# Patient Record
Sex: FEMALE | Race: WHITE | NOT HISPANIC OR LATINO | Employment: FULL TIME | RURAL
[De-identification: names, ages, dates, MRNs, and addresses within clinical notes are randomized per-mention and may not be internally consistent; named-entity substitution may affect disease eponyms.]

---

## 2022-04-27 ENCOUNTER — TELEMEDICINE (OUTPATIENT)
Dept: FAMILY MEDICINE CLINIC | Facility: CLINIC | Age: 52
End: 2022-04-27

## 2022-04-27 VITALS — BODY MASS INDEX: 27.66 KG/M2 | WEIGHT: 162 LBS | HEIGHT: 64 IN

## 2022-04-27 DIAGNOSIS — Z71.3 DIETARY COUNSELING AND SURVEILLANCE: Primary | ICD-10-CM

## 2022-04-27 PROBLEM — F32.A DEPRESSION: Status: ACTIVE | Noted: 2022-04-27

## 2022-04-27 PROCEDURE — 99213 OFFICE O/P EST LOW 20 MIN: CPT | Performed by: NURSE PRACTITIONER

## 2022-04-27 RX ORDER — VILAZODONE HYDROCHLORIDE 40 MG/1
40 TABLET ORAL DAILY
COMMUNITY

## 2022-04-27 RX ORDER — PHENTERMINE HYDROCHLORIDE 37.5 MG/1
37.5 TABLET ORAL
Qty: 30 TABLET | Refills: 0 | Status: SHIPPED | OUTPATIENT
Start: 2022-04-27 | End: 2022-06-14 | Stop reason: SDUPTHER

## 2022-04-27 NOTE — PROGRESS NOTES
"Chief Complaint  Med Refill and weight management  This was an audio and video enabled telemedicine encounter.  Subjective          Eryn Dao presents to [facility]  Patient is here today for follow-up on her weight management.  She has been taking phentermine and doing well.  She has been trying to watch her diet more closely and exercise.  She denies any side effects of the medication.      Objective   Vital Signs:   Ht 162.6 cm (64\")   Wt 73.5 kg (162 lb)   BMI 27.81 kg/m²     Body mass index is 27.81 kg/m².    Review of Systems   Constitutional: Negative for fatigue and fever.   Respiratory: Negative for shortness of breath.    Cardiovascular: Negative for chest pain, palpitations and leg swelling.   Neurological: Negative for syncope.   Psychiatric/Behavioral: The patient is not nervous/anxious.         Negative depression          Current Outpatient Medications:   •  vilazodone (VIIBRYD) 40 MG tablet tablet, Take 40 mg by mouth Daily., Disp: , Rfl:   •  phentermine (ADIPEX-P) 37.5 MG tablet, Take 1 tablet by mouth Every Morning Before Breakfast., Disp: 30 tablet, Rfl: 0      Allergies: Patient has no allergy information on record.    Physical Exam  Constitutional:       Appearance: Normal appearance.   Neurological:      Mental Status: She is alert.   Psychiatric:         Mood and Affect: Mood normal.         Thought Content: Thought content normal.         Judgment: Judgment normal.          Result Review :                   Assessment and Plan    Diagnoses and all orders for this visit:    1. Dietary counseling and surveillance (Primary)  Comments:  Continue phentermine.  1200 -calorie/day diet and exercise 3 to 5 days/week.  Patient is doing well with no side effects.  Orders:  -     phentermine (ADIPEX-P) 37.5 MG tablet; Take 1 tablet by mouth Every Morning Before Breakfast.  Dispense: 30 tablet; Refill: 0              BMI has not been calculated during today's encounter. "        Follow Up   No follow-ups on file.  Patient was given instructions and counseling regarding her condition or for health maintenance advice. Please see specific information pulled into the AVS if appropriate.     PAULA Luis

## 2022-06-14 ENCOUNTER — PATIENT MESSAGE (OUTPATIENT)
Dept: FAMILY MEDICINE CLINIC | Facility: CLINIC | Age: 52
End: 2022-06-14

## 2022-06-14 ENCOUNTER — TELEMEDICINE (OUTPATIENT)
Dept: FAMILY MEDICINE CLINIC | Facility: CLINIC | Age: 52
End: 2022-06-14

## 2022-06-14 VITALS — WEIGHT: 159 LBS | HEIGHT: 64 IN | BODY MASS INDEX: 27.14 KG/M2

## 2022-06-14 DIAGNOSIS — Z71.3 DIETARY COUNSELING AND SURVEILLANCE: Primary | ICD-10-CM

## 2022-06-14 DIAGNOSIS — Z71.3 DIETARY COUNSELING AND SURVEILLANCE: ICD-10-CM

## 2022-06-14 PROCEDURE — 99213 OFFICE O/P EST LOW 20 MIN: CPT | Performed by: NURSE PRACTITIONER

## 2022-06-14 RX ORDER — PHENTERMINE HYDROCHLORIDE 37.5 MG/1
37.5 TABLET ORAL
Qty: 30 TABLET | Refills: 0 | Status: SHIPPED | OUTPATIENT
Start: 2022-06-14 | End: 2022-07-27 | Stop reason: SDUPTHER

## 2022-06-14 NOTE — PROGRESS NOTES
"Chief Complaint  weight management  This was an audio and video enabled telemedicine encounter.  Subjective          Eryn Dao presents to [facility]  Pt is here to follow up on weight management. She has been taking Phentermine and doing well.       Objective   Vital Signs:   Ht 162.6 cm (64\")   Wt 72.1 kg (159 lb)   BMI 27.29 kg/m²     Body mass index is 27.29 kg/m².    Review of Systems   Constitutional: Negative for fatigue and fever.   Respiratory: Negative for shortness of breath.    Cardiovascular: Negative for chest pain, palpitations and leg swelling.   Neurological: Negative for syncope.   Psychiatric/Behavioral: The patient is not nervous/anxious.           Current Outpatient Medications:   •  phentermine (ADIPEX-P) 37.5 MG tablet, Take 1 tablet by mouth Every Morning Before Breakfast., Disp: 30 tablet, Rfl: 0  •  vilazodone (VIIBRYD) 40 MG tablet tablet, Take 40 mg by mouth Daily., Disp: , Rfl:       Allergies: Patient has no known allergies.    Physical Exam  Constitutional:       Appearance: Normal appearance.   Neurological:      Mental Status: She is alert.   Psychiatric:         Mood and Affect: Mood normal.         Thought Content: Thought content normal.         Judgment: Judgment normal.          Result Review :                   Assessment and Plan    Diagnoses and all orders for this visit:    1. Dietary counseling and surveillance (Primary)  Comments:  Continue Phentermine. 1200 baldev/day diet and exercise several days per week.   Orders:  -     phentermine (ADIPEX-P) 37.5 MG tablet; Take 1 tablet by mouth Every Morning Before Breakfast.  Dispense: 30 tablet; Refill: 0    2. Dietary counseling and surveillance  Comments:  Continue phentermine.  1200 -calorie/day diet and exercise 3 to 5 days/week.  Patient is doing well with no side effects.  Orders:  -     phentermine (ADIPEX-P) 37.5 MG tablet; Take 1 tablet by mouth Every Morning Before Breakfast.  Dispense: " 30 tablet; Refill: 0                Follow Up   No follow-ups on file.  Patient was given instructions and counseling regarding her condition or for health maintenance advice. Please see specific information pulled into the AVS if appropriate.     PAULA Luis

## 2022-07-27 ENCOUNTER — TELEMEDICINE (OUTPATIENT)
Dept: FAMILY MEDICINE CLINIC | Facility: CLINIC | Age: 52
End: 2022-07-27

## 2022-07-27 VITALS — BODY MASS INDEX: 27.49 KG/M2 | HEIGHT: 64 IN | WEIGHT: 161 LBS

## 2022-07-27 DIAGNOSIS — Z71.3 DIETARY COUNSELING AND SURVEILLANCE: ICD-10-CM

## 2022-07-27 PROCEDURE — 99213 OFFICE O/P EST LOW 20 MIN: CPT | Performed by: NURSE PRACTITIONER

## 2022-07-27 RX ORDER — PHENTERMINE HYDROCHLORIDE 37.5 MG/1
37.5 TABLET ORAL
Qty: 30 TABLET | Refills: 0 | Status: SHIPPED | OUTPATIENT
Start: 2022-07-27 | End: 2022-09-16 | Stop reason: SDUPTHER

## 2022-07-27 NOTE — PROGRESS NOTES
"Chief Complaint  weight management  This was an audio and video enabled telemedicine encounter.  Subjective          Eryn Dao presents to [facility]  Pt is here to follow up on her medications. She has been taking Phentermine for weight loss. She ran out a few days and gained a few pounds back. She denies side effects from the medication.       Objective   Vital Signs:   Ht 162.6 cm (64\")   Wt 73 kg (161 lb)   BMI 27.64 kg/m²     Body mass index is 27.64 kg/m².    Review of Systems   Constitutional: Negative for fatigue and fever.   Respiratory: Negative for shortness of breath.    Cardiovascular: Negative for chest pain, palpitations and leg swelling.   Neurological: Negative for syncope.   Psychiatric/Behavioral: The patient is not nervous/anxious.           Current Outpatient Medications:   •  phentermine (ADIPEX-P) 37.5 MG tablet, Take 1 tablet by mouth Every Morning Before Breakfast., Disp: 30 tablet, Rfl: 0  •  vilazodone (VIIBRYD) 40 MG tablet tablet, Take 40 mg by mouth Daily., Disp: , Rfl:       Allergies: Patient has no known allergies.    Physical Exam  Constitutional:       Appearance: Normal appearance.   Neurological:      Mental Status: She is alert.   Psychiatric:         Mood and Affect: Mood normal.         Behavior: Behavior normal.         Thought Content: Thought content normal.         Judgment: Judgment normal.          Result Review :                   Assessment and Plan    Diagnoses and all orders for this visit:    1. Dietary counseling and surveillance  Comments:  Continue phentermine.  1200 -calorie/day diet and exercise 3 to 5 days/week.  Patient is doing well with no side effects.  Orders:  -     phentermine (ADIPEX-P) 37.5 MG tablet; Take 1 tablet by mouth Every Morning Before Breakfast.  Dispense: 30 tablet; Refill: 0                Follow Up   No follow-ups on file.  Patient was given instructions and counseling regarding her condition or for " health maintenance advice. Please see specific information pulled into the AVS if appropriate.     PAULA Luis

## 2022-09-12 ENCOUNTER — PATIENT MESSAGE (OUTPATIENT)
Dept: FAMILY MEDICINE CLINIC | Facility: CLINIC | Age: 52
End: 2022-09-12

## 2022-09-12 ENCOUNTER — TELEMEDICINE (OUTPATIENT)
Dept: FAMILY MEDICINE CLINIC | Facility: CLINIC | Age: 52
End: 2022-09-12

## 2022-09-12 VITALS — HEIGHT: 64 IN | BODY MASS INDEX: 27.31 KG/M2 | WEIGHT: 160 LBS

## 2022-09-12 DIAGNOSIS — Z71.3 DIETARY COUNSELING AND SURVEILLANCE: ICD-10-CM

## 2022-09-12 DIAGNOSIS — Z71.3 DIETARY COUNSELING AND SURVEILLANCE: Primary | ICD-10-CM

## 2022-09-12 PROCEDURE — 99213 OFFICE O/P EST LOW 20 MIN: CPT | Performed by: NURSE PRACTITIONER

## 2022-09-12 NOTE — PROGRESS NOTES
"Chief Complaint  Med Refill  This was an audio and video enabled telemedicine encounter. Patient was in home, provider was in office.   Subjective          Eryn Dao presents to [facility]  Pt is here to follow up on weight management. She has been taking Phentermine and doing well. She is watching her diet and exercising.       Objective   Vital Signs:   Ht 162.6 cm (64\")   Wt 72.6 kg (160 lb)   BMI 27.46 kg/m²     Body mass index is 27.46 kg/m².    Review of Systems   Constitutional: Negative for fatigue and fever.   Respiratory: Negative for shortness of breath.    Cardiovascular: Negative for chest pain, palpitations and leg swelling.   Neurological: Negative for syncope.   Psychiatric/Behavioral: The patient is not nervous/anxious.           Current Outpatient Medications:   •  phentermine (ADIPEX-P) 37.5 MG tablet, Take 1 tablet by mouth Every Morning Before Breakfast., Disp: 30 tablet, Rfl: 0  •  vilazodone (VIIBRYD) 40 MG tablet tablet, Take 40 mg by mouth Daily., Disp: , Rfl:       Allergies: Patient has no known allergies.    Physical Exam  Constitutional:       Appearance: Normal appearance.   Neurological:      Mental Status: She is alert.   Psychiatric:         Mood and Affect: Mood normal.         Behavior: Behavior normal.         Thought Content: Thought content normal.         Judgment: Judgment normal.          Result Review :                   Assessment and Plan    Diagnoses and all orders for this visit:    1. Dietary counseling and surveillance (Primary)  Comments:  Continue Phentermine. 1200 baldev/day diet and exercise several days per week.   Orders:  -     phentermine (ADIPEX-P) 37.5 MG tablet; Take 1 tablet by mouth Every Morning Before Breakfast.  Dispense: 30 tablet; Refill: 0    2. Dietary counseling and surveillance  Comments:  Continue phentermine.  1200 -calorie/day diet and exercise 3 to 5 days/week.  Patient is doing well with no side " effects.  Orders:  -     phentermine (ADIPEX-P) 37.5 MG tablet; Take 1 tablet by mouth Every Morning Before Breakfast.  Dispense: 30 tablet; Refill: 0                Follow Up   Return in about 1 month (around 10/12/2022) for Recheck.  Patient was given instructions and counseling regarding her condition or for health maintenance advice. Please see specific information pulled into the AVS if appropriate.     PAULA Luis

## 2022-09-16 RX ORDER — PHENTERMINE HYDROCHLORIDE 37.5 MG/1
37.5 TABLET ORAL
Qty: 30 TABLET | Refills: 0 | Status: SHIPPED | OUTPATIENT
Start: 2022-09-16 | End: 2022-10-28 | Stop reason: SDUPTHER

## 2022-10-28 ENCOUNTER — TELEMEDICINE (OUTPATIENT)
Dept: FAMILY MEDICINE CLINIC | Facility: CLINIC | Age: 52
End: 2022-10-28

## 2022-10-28 DIAGNOSIS — F33.9 DEPRESSION, RECURRENT: ICD-10-CM

## 2022-10-28 DIAGNOSIS — Z71.3 DIETARY COUNSELING AND SURVEILLANCE: Primary | ICD-10-CM

## 2022-10-28 PROCEDURE — 99213 OFFICE O/P EST LOW 20 MIN: CPT | Performed by: NURSE PRACTITIONER

## 2022-10-28 RX ORDER — ESTRADIOL 0.04 MG/D
1 FILM, EXTENDED RELEASE TRANSDERMAL 2 TIMES WEEKLY
COMMUNITY
End: 2023-03-15

## 2022-10-28 RX ORDER — PROGESTERONE 100 MG/1
100 CAPSULE ORAL DAILY
COMMUNITY

## 2022-10-28 RX ORDER — PHENTERMINE HYDROCHLORIDE 37.5 MG/1
37.5 TABLET ORAL
Qty: 30 TABLET | Refills: 0 | Status: SHIPPED | OUTPATIENT
Start: 2022-10-28 | End: 2022-12-12 | Stop reason: SDUPTHER

## 2022-10-28 NOTE — PROGRESS NOTES
Chief Complaint  Med Refill  This was a video and audio enabled telemedicine encounter. The patient was in home. The provider was in office.   Subjective          Eryn Dao presents to [facility]  History of Present Illness  Pt is here for follow up on weight management. She has been taking Phentermine and doing well. She is active and tries to watch her diet.       Objective   Vital Signs:   There were no vitals taken for this visit.    There is no height or weight on file to calculate BMI.    Review of Systems   Constitutional: Negative for fatigue and fever.   Respiratory: Negative for shortness of breath.    Cardiovascular: Negative for chest pain, palpitations and leg swelling.   Neurological: Negative for syncope.   Psychiatric/Behavioral: The patient is not nervous/anxious.           Current Outpatient Medications:   •  estradiol (Lyllana) 0.0375 MG/24HR patch, Place 1 patch on the skin as directed by provider 2 (Two) Times a Week., Disp: , Rfl:   •  phentermine (ADIPEX-P) 37.5 MG tablet, Take 1 tablet by mouth Every Morning Before Breakfast., Disp: 30 tablet, Rfl: 0  •  Progesterone (PROMETRIUM) 100 MG capsule, Take 1 capsule by mouth Daily., Disp: , Rfl:   •  vilazodone (VIIBRYD) 40 MG tablet tablet, Take 40 mg by mouth Daily., Disp: , Rfl:       Allergies: Patient has no known allergies.    Physical Exam  Constitutional:       Appearance: Normal appearance.   Neurological:      Mental Status: She is alert.   Psychiatric:         Mood and Affect: Mood normal.         Behavior: Behavior normal.         Thought Content: Thought content normal.         Judgment: Judgment normal.          Result Review :                   Assessment and Plan    Diagnoses and all orders for this visit:    1. Dietary counseling and surveillance (Primary)  Comments:  Continue phentermine.  1200 -calorie/day diet and exercise 3 to 5 days/week.  Patient is doing well with no side effects.  Orders:  -      phentermine (ADIPEX-P) 37.5 MG tablet; Take 1 tablet by mouth Every Morning Before Breakfast.  Dispense: 30 tablet; Refill: 0    2. Depression, recurrent (HCC)  Comments:  Continue Viibryd. I will continue Phentermine for now as depression is worsened with weight gain.                 Follow Up   No follow-ups on file.  Patient was given instructions and counseling regarding her condition or for health maintenance advice. Please see specific information pulled into the AVS if appropriate.     PAULA Luis

## 2022-12-12 ENCOUNTER — TELEMEDICINE (OUTPATIENT)
Dept: FAMILY MEDICINE CLINIC | Facility: CLINIC | Age: 52
End: 2022-12-12

## 2022-12-12 VITALS — HEIGHT: 64 IN | BODY MASS INDEX: 26.98 KG/M2 | WEIGHT: 158 LBS

## 2022-12-12 DIAGNOSIS — E66.3 OVERWEIGHT (BMI 25.0-29.9): Primary | ICD-10-CM

## 2022-12-12 DIAGNOSIS — Z71.3 DIETARY COUNSELING AND SURVEILLANCE: ICD-10-CM

## 2022-12-12 PROCEDURE — 99213 OFFICE O/P EST LOW 20 MIN: CPT | Performed by: NURSE PRACTITIONER

## 2022-12-12 RX ORDER — PHENTERMINE HYDROCHLORIDE 37.5 MG/1
37.5 TABLET ORAL
Qty: 30 TABLET | Refills: 0 | Status: SHIPPED | OUTPATIENT
Start: 2022-12-12 | End: 2023-01-30 | Stop reason: SDUPTHER

## 2022-12-12 NOTE — PROGRESS NOTES
"Chief Complaint  weight management  This was a video and audio enabled telemedicine encounter. The patient was in home. The provider was in office.   Subjective          Eryn Dao presents to [facility]  History of Present Illness  Pt is here for follow up on weight management. She has been taking Phentermine and doing well. She denies side effects with the medication. She is watching her diet and exercising.       Objective   Vital Signs:   Ht 162.6 cm (64\")   Wt 71.7 kg (158 lb)   BMI 27.12 kg/m²     Body mass index is 27.12 kg/m².    Review of Systems   Constitutional: Negative for fatigue and fever.   Respiratory: Negative for shortness of breath.    Cardiovascular: Negative for chest pain, palpitations and leg swelling.   Neurological: Negative for syncope.   Psychiatric/Behavioral: The patient is not nervous/anxious.           Current Outpatient Medications:   •  phentermine (ADIPEX-P) 37.5 MG tablet, Take 1 tablet by mouth Every Morning Before Breakfast., Disp: 30 tablet, Rfl: 0  •  estradiol (Lyllana) 0.0375 MG/24HR patch, Place 1 patch on the skin as directed by provider 2 (Two) Times a Week., Disp: , Rfl:   •  Progesterone (PROMETRIUM) 100 MG capsule, Take 1 capsule by mouth Daily., Disp: , Rfl:   •  vilazodone (VIIBRYD) 40 MG tablet tablet, Take 40 mg by mouth Daily., Disp: , Rfl:       Allergies: Patient has no known allergies.    Physical Exam  Constitutional:       Appearance: Normal appearance.   Neurological:      Mental Status: She is alert.   Psychiatric:         Mood and Affect: Mood normal.         Behavior: Behavior normal.         Thought Content: Thought content normal.         Judgment: Judgment normal.          Result Review :                   Assessment and Plan    Diagnoses and all orders for this visit:    1. Overweight (BMI 25.0-29.9) (Primary)    2. Dietary counseling and surveillance  Comments:  Continue phentermine.  1200 -calorie/day diet and " exercise 3 to 5 days/week.  Patient is doing well with no side effects.  Orders:  -     phentermine (ADIPEX-P) 37.5 MG tablet; Take 1 tablet by mouth Every Morning Before Breakfast.  Dispense: 30 tablet; Refill: 0                Follow Up   No follow-ups on file.  Patient was given instructions and counseling regarding her condition or for health maintenance advice. Please see specific information pulled into the AVS if appropriate.     PAULA Luis

## 2023-01-30 ENCOUNTER — PATIENT MESSAGE (OUTPATIENT)
Dept: FAMILY MEDICINE CLINIC | Facility: CLINIC | Age: 53
End: 2023-01-30

## 2023-01-30 ENCOUNTER — TELEMEDICINE (OUTPATIENT)
Dept: FAMILY MEDICINE CLINIC | Facility: CLINIC | Age: 53
End: 2023-01-30
Payer: COMMERCIAL

## 2023-01-30 VITALS — WEIGHT: 158 LBS | BODY MASS INDEX: 26.98 KG/M2 | HEIGHT: 64 IN

## 2023-01-30 DIAGNOSIS — Z71.3 DIETARY COUNSELING AND SURVEILLANCE: ICD-10-CM

## 2023-01-30 PROCEDURE — 99213 OFFICE O/P EST LOW 20 MIN: CPT | Performed by: NURSE PRACTITIONER

## 2023-01-30 RX ORDER — PHENTERMINE HYDROCHLORIDE 37.5 MG/1
37.5 TABLET ORAL
Qty: 30 TABLET | Refills: 0 | Status: SHIPPED | OUTPATIENT
Start: 2023-01-30 | End: 2023-03-15 | Stop reason: SDUPTHER

## 2023-01-30 NOTE — PROGRESS NOTES
"Chief Complaint  weight management  This was a video and audio enabled telemedicine encounter. The patient was in home. The provider was in office.     Subjective          Eryn Dao presents to [facility]  History of Present Illness  Pt is here to follow up on weight management. She has been taking Phentermine and doing well. She is exercising and watching her diet.       Objective   Vital Signs:   Ht 162.6 cm (64\")   Wt 71.7 kg (158 lb)   BMI 27.12 kg/m²     Body mass index is 27.12 kg/m².    Review of Systems   Constitutional: Negative for fatigue and fever.   Respiratory: Negative for shortness of breath.    Cardiovascular: Negative for chest pain, palpitations and leg swelling.   Neurological: Negative for syncope.   Psychiatric/Behavioral: The patient is not nervous/anxious.           Current Outpatient Medications:   •  phentermine (ADIPEX-P) 37.5 MG tablet, Take 1 tablet by mouth Every Morning Before Breakfast., Disp: 30 tablet, Rfl: 0  •  estradiol (Lyllana) 0.0375 MG/24HR patch, Place 1 patch on the skin as directed by provider 2 (Two) Times a Week., Disp: , Rfl:   •  Progesterone (PROMETRIUM) 100 MG capsule, Take 1 capsule by mouth Daily., Disp: , Rfl:   •  vilazodone (VIIBRYD) 40 MG tablet tablet, Take 40 mg by mouth Daily., Disp: , Rfl:       Allergies: Patient has no known allergies.    Physical Exam  Constitutional:       Appearance: Normal appearance.   Neurological:      Mental Status: She is alert.   Psychiatric:         Mood and Affect: Mood normal.         Behavior: Behavior normal.         Thought Content: Thought content normal.         Judgment: Judgment normal.          Result Review :                   Assessment and Plan    Diagnoses and all orders for this visit:    1. Dietary counseling and surveillance  Comments:  Continue phentermine.  1200 -calorie/day diet and exercise 3 to 5 days/week.  Patient is doing well with no side effects.  Orders:  -     " phentermine (ADIPEX-P) 37.5 MG tablet; Take 1 tablet by mouth Every Morning Before Breakfast.  Dispense: 30 tablet; Refill: 0                Follow Up   No follow-ups on file.  Patient was given instructions and counseling regarding her condition or for health maintenance advice. Please see specific information pulled into the AVS if appropriate.     PAULA Luis

## 2023-03-15 ENCOUNTER — TELEMEDICINE (OUTPATIENT)
Dept: FAMILY MEDICINE CLINIC | Facility: CLINIC | Age: 53
End: 2023-03-15
Payer: COMMERCIAL

## 2023-03-15 VITALS — BODY MASS INDEX: 27.66 KG/M2 | WEIGHT: 162 LBS | HEIGHT: 64 IN

## 2023-03-15 DIAGNOSIS — E66.3 OVERWEIGHT: Primary | ICD-10-CM

## 2023-03-15 DIAGNOSIS — Z71.3 DIETARY COUNSELING AND SURVEILLANCE: ICD-10-CM

## 2023-03-15 PROCEDURE — 99213 OFFICE O/P EST LOW 20 MIN: CPT | Performed by: NURSE PRACTITIONER

## 2023-03-15 RX ORDER — PHENTERMINE HYDROCHLORIDE 37.5 MG/1
37.5 TABLET ORAL
Qty: 30 TABLET | Refills: 0 | Status: SHIPPED | OUTPATIENT
Start: 2023-03-15

## 2023-03-15 NOTE — PROGRESS NOTES
"Chief Complaint  weight management  This was a video and audio enabled telemedicine encounter. The patient was in home. The provider was in office.     Subjective          Eryn Dao presents to [facility]  History of Present Illness  Pt is here to follow up on weight management. She has been taking Phentermine and doing well.       Objective   Vital Signs:   Ht 162.6 cm (64\")   Wt 73.5 kg (162 lb)   BMI 27.81 kg/m²     Body mass index is 27.81 kg/m².    Review of Systems   Constitutional: Negative for fatigue and fever.   Respiratory: Negative for shortness of breath.    Cardiovascular: Negative for chest pain, palpitations and leg swelling.   Neurological: Negative for syncope.   Psychiatric/Behavioral: The patient is not nervous/anxious.           Current Outpatient Medications:   •  phentermine (ADIPEX-P) 37.5 MG tablet, Take 1 tablet by mouth Every Morning Before Breakfast., Disp: 30 tablet, Rfl: 0  •  Progesterone (PROMETRIUM) 100 MG capsule, Take 1 capsule by mouth Daily., Disp: , Rfl:   •  vilazodone (VIIBRYD) 40 MG tablet tablet, Take 40 mg by mouth Daily., Disp: , Rfl:       Allergies: Patient has no known allergies.    Physical Exam  Constitutional:       Appearance: Normal appearance.   Neurological:      Mental Status: She is alert.   Psychiatric:         Mood and Affect: Mood normal.         Behavior: Behavior normal.         Thought Content: Thought content normal.         Judgment: Judgment normal.          Result Review :                   Assessment and Plan    Diagnoses and all orders for this visit:    1. Overweight (Primary)    2. Dietary counseling and surveillance  Comments:  Continue phentermine.  1200 -calorie/day diet and exercise 3 to 5 days/week.  Patient is doing well with no side effects.  Orders:  -     phentermine (ADIPEX-P) 37.5 MG tablet; Take 1 tablet by mouth Every Morning Before Breakfast.  Dispense: 30 tablet; Refill: 0                Follow Up "   No follow-ups on file.  Patient was given instructions and counseling regarding her condition or for health maintenance advice. Please see specific information pulled into the AVS if appropriate.     Adrianne Garcia APRN

## 2023-04-19 RX ORDER — VILAZODONE HYDROCHLORIDE 40 MG/1
40 TABLET ORAL DAILY
Qty: 30 TABLET | Refills: 0 | Status: SHIPPED | OUTPATIENT
Start: 2023-04-19

## 2023-04-26 DIAGNOSIS — R41.840 ATTENTION DEFICIT: Primary | ICD-10-CM

## 2023-05-04 ENCOUNTER — OFFICE VISIT (OUTPATIENT)
Dept: FAMILY MEDICINE CLINIC | Facility: CLINIC | Age: 53
End: 2023-05-04
Payer: COMMERCIAL

## 2023-05-04 ENCOUNTER — TELEMEDICINE (OUTPATIENT)
Dept: PSYCHIATRY | Facility: CLINIC | Age: 53
End: 2023-05-04
Payer: COMMERCIAL

## 2023-05-04 VITALS
BODY MASS INDEX: 28.34 KG/M2 | SYSTOLIC BLOOD PRESSURE: 122 MMHG | DIASTOLIC BLOOD PRESSURE: 90 MMHG | HEIGHT: 64 IN | HEART RATE: 74 BPM | OXYGEN SATURATION: 99 % | WEIGHT: 166 LBS

## 2023-05-04 DIAGNOSIS — Z00.00 ROUTINE MEDICAL EXAM: Primary | ICD-10-CM

## 2023-05-04 DIAGNOSIS — Z13.220 SCREENING FOR LIPID DISORDERS: ICD-10-CM

## 2023-05-04 DIAGNOSIS — F33.9 DEPRESSION, RECURRENT: ICD-10-CM

## 2023-05-04 DIAGNOSIS — E56.9 VITAMIN DEFICIENCY: ICD-10-CM

## 2023-05-04 DIAGNOSIS — F98.8 ATTENTION DEFICIT DISORDER (ADD) WITHOUT HYPERACTIVITY: Primary | ICD-10-CM

## 2023-05-04 DIAGNOSIS — Z13.1 SCREENING FOR DIABETES MELLITUS: ICD-10-CM

## 2023-05-04 PROCEDURE — 99396 PREV VISIT EST AGE 40-64: CPT | Performed by: NURSE PRACTITIONER

## 2023-05-04 RX ORDER — VILAZODONE HYDROCHLORIDE 40 MG/1
40 TABLET ORAL DAILY
Qty: 90 TABLET | Refills: 3 | Status: SHIPPED | OUTPATIENT
Start: 2023-05-04

## 2023-05-04 RX ORDER — VILOXAZINE HYDROCHLORIDE 200 MG/1
CAPSULE, EXTENDED RELEASE ORAL
Qty: 69 CAPSULE | Refills: 0 | Status: SHIPPED | OUTPATIENT
Start: 2023-05-04 | End: 2023-06-03

## 2023-05-04 NOTE — PROGRESS NOTES
"Chief Complaint  Annual Exam    Subjective          Eryn Dao presents to [facility] for preventative yearly exam.   History of Present Illness  Pt is here for a physical exam and medication refills. She is doing well today with no complaints. She is active and tries to watch her diet. She feels that her depression is controlled.       Objective   Vital Signs:   /90   Pulse 74   Ht 162.6 cm (64\")   Wt 75.3 kg (166 lb)   SpO2 99%   BMI 28.49 kg/m²     Body mass index is 28.49 kg/m².    Predictive Model Details   No score data available for Risk of Fall        PHQ-9 Depression Screening  Little interest or pleasure in doing things?     Feeling down, depressed, or hopeless?     Trouble falling or staying asleep, or sleeping too much?     Feeling tired or having little energy?     Poor appetite or overeating?     Feeling bad about yourself - or that you are a failure or have let yourself or your family down?     Trouble concentrating on things, such as reading the newspaper or watching television?     Moving or speaking so slowly that other people could have noticed? Or the opposite - being so fidgety or restless that you have been moving around a lot more than usual?     Thoughts that you would be better off dead, or of hurting yourself in some way?     PHQ-9 Total Score     If you checked off any problems, how difficult have these problems made it for you to do your work, take care of things at home, or get along with other people?       Health Maintenance   Topic Date Due   • COLORECTAL CANCER SCREENING  Never done   • PAP SMEAR  06/10/2023 (Originally 4/27/2022)   • HEPATITIS C SCREENING  07/27/2023 (Originally 4/27/2022)   • COVID-19 Vaccine (3 - Booster for Moderna series) 07/27/2023 (Originally 3/16/2021)   • ZOSTER VACCINE (1 of 2) 07/27/2023 (Originally 5/4/2020)   • MAMMOGRAM  05/04/2024 (Originally 1970)   • INFLUENZA VACCINE  08/01/2023   • ANNUAL PHYSICAL  " 2024   • TDAP/TD VACCINES (2 - Td or Tdap) 2025   • Pneumococcal Vaccine 0-64  Aged Out        Immunization History   Administered Date(s) Administered   • COVID-19 (MODERNA) 1st,2nd,3rd Dose Monovalent 2020, 2021   • MMR 2015   • Tdap 2015       Review of Systems   Constitutional: Negative for fatigue and fever.   Respiratory: Negative for shortness of breath.    Cardiovascular: Negative for chest pain, palpitations and leg swelling.   Neurological: Negative for syncope.   Psychiatric/Behavioral: The patient is not nervous/anxious.         Past History:  Medical History: has a past medical history of Neurotic depression.   Surgical History: has a past surgical history that includes Hernia repair; Cosmetic surgery; and  section.   Family History: family history is not on file.   Social History: reports that she has never smoked. She has never used smokeless tobacco.       Allergies: Patient has no known allergies.    Physical Exam  Constitutional:       Appearance: Normal appearance.   HENT:      Head: Normocephalic.   Eyes:      Conjunctiva/sclera: Conjunctivae normal.      Pupils: Pupils are equal, round, and reactive to light.   Cardiovascular:      Rate and Rhythm: Normal rate and regular rhythm.      Heart sounds: Normal heart sounds.   Pulmonary:      Effort: Pulmonary effort is normal.      Breath sounds: Normal breath sounds.   Abdominal:      Tenderness: There is no abdominal tenderness.   Musculoskeletal:         General: Normal range of motion.   Skin:     General: Skin is warm and dry.      Capillary Refill: Capillary refill takes less than 2 seconds.   Neurological:      General: No focal deficit present.      Mental Status: She is alert and oriented to person, place, and time.   Psychiatric:         Mood and Affect: Mood normal.         Behavior: Behavior normal.         Thought Content: Thought content normal.         Judgment: Judgment normal.           Result Review :                   Assessment and Plan    Diagnoses and all orders for this visit:    1. Routine medical exam (Primary)  Comments:  We discussed diet, exercise, and prev counseling. Pap/mammo UTD. Pt will schedule colonoscopy. Labs drawn.   Orders:  -     CBC & Differential; Future  -     Comprehensive Metabolic Panel; Future  -     TSH; Future  -     CBC & Differential  -     Comprehensive Metabolic Panel  -     TSH    2. Screening for diabetes mellitus  -     Hemoglobin A1c; Future  -     Hemoglobin A1c    3. Screening for lipid disorders  -     Lipid Panel; Future  -     Lipid Panel    4. Vitamin deficiency  -     Vitamin B12; Future  -     Vitamin D,25-Hydroxy; Future  -     Folate; Future  -     Vitamin B12  -     Vitamin D,25-Hydroxy  -     Folate    5. Depression, recurrent  Comments:  Continue Viibryd.  Orders:  -     vilazodone (VIIBRYD) 40 MG tablet tablet; Take 1 tablet by mouth Daily.  Dispense: 90 tablet; Refill: 3                Current Outpatient Medications:   •  Progesterone (PROMETRIUM) 100 MG capsule, Take 1 capsule by mouth Daily., Disp: , Rfl:   •  vilazodone (VIIBRYD) 40 MG tablet tablet, Take 1 tablet by mouth Daily., Disp: 90 tablet, Rfl: 3    Follow Up   Return in about 1 year (around 5/4/2024) for Annual physical.  Patient was given instructions and counseling regarding her condition or for health maintenance advice. Please see specific information pulled into the AVS if appropriate.     PAULA Luis

## 2023-05-04 NOTE — PROGRESS NOTES
This provider is located at the [facility] (through [facility]), [address], [place], [address] using a secure [employer] Video Visit through [employer]. Patient is being seen remotely via telehealth at their home address [address] [facility], and stated they are in a secure environment for this session. The patient's condition being diagnosed/treated is appropriate for telemedicine. The provider identified herself as well as her credentials.   The patient, and/or patients guardian, consent to be seen remotely, and when consent is given they understand that the consent allows for patient identifiable information to be sent to a third party as needed.   They may refuse to be seen remotely at any time. The electronic data is encrypted and password protected, and the patient and/or guardian has been advised of the potential risks to privacy not withstanding such measures.    You have chosen to receive care through a telehealth visit.  Do you consent to use a video/audio connection for your medical care today? Yes    Patient identifiers utilized: Name and date of birth.    Patient verbally confirmed consent for today's encounter:  May 4, 2023    Subjective   Eryn Dao is a 53 y.o. female who presents today for initial evaluation     Chief Complaint:    Chief Complaint   Patient presents with   • Psychiatric Evaluation     Difficulty focusing/concentrating        History of Present Illness:  ADD  History of Present Illness  Eryn is a 53-year-old  female seen to establish outpatient behavioral health services.  Her initial evaluation today is for ADD, she is referred by her PCP.  Patient reports that she was prescribed phentermine for weight loss and noticed an improvement in symptoms consistent with ADD.  Of note phentermine has been used off label for ADD. Phentermine works by stimulating neurons to release the neurotransmitters dopamine and  "norepinephrine. This medication can improve focus and concentration. At this time, patient is not taking Phentermine and wishes to be screened for further evaluation for ADD as well as discussing nonstimulant options to treat ADD.      Medical history:  None    Surgical history:  Hernia repair   for abruption  Tummy tuck  Breast reduction    Illicit substance use:  Denies    ETOH:  One glass of wine per night    Psychosocial history:  Born: [place], [place]  Raised by: parents  Siblings: 1 brother, 2 years older than the patient  Describes childhood as: normal  Abuse history:  \" Physical: denies  \" Emotional: denies  \" Mental: denies  \" Sexual: denies  \" Rape: denies    Highest education level achieved: Masters degree  History of bullying? denies  Developmental delays/Special education classes: was shy and awkward, good student, \"B\" average  Work history: Physician assistant in ED    history: denies  Impulsive or risky behavior: denies  Legal history/previous chargers or incarcerations: denies  Marital status:   Number of children: 1 son  Self-harming behavior: denies  Suicidal attempts: denies  Social support: 3 close friends    Previous diagnoses: ? 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  Patient reports since the divorce she has had a substantial amount of responsibilities placed on her.  Patient reports she has difficulty keeping track of everything, trying to manage " everything on her own.  She has excellent support from friends who help her in her daily routine and she also works full-time.           The following portions of the patient's history were reviewed and updated as appropriate: allergies, current medications, past family history, past medical history, past social history, past surgical history and problem list.    Past Psychiatric History:  Began Treatment:never treated for ADD, but had relief of symptoms with Phentermine for weight loss  Diagnoses:PPD  Psychiatrist:[place]  Therapist:Denies  Admission History:Denies  Medication Trials:paxil, viibryd  Self Harm: Denies  Suicide Attempts:Denies   Psychosis, Anxiety, Depression: PPD    Past Medical History:  Past Medical History:   Diagnosis Date   • Neurotic depression        Substance Abuse History:   Types:Denies all, including illicit  Withdrawal Symptoms:Denies  Longest Period Sober:Not Applicable   AA: Not applicable     Social History:  Social History     Socioeconomic History   • Marital status:    Tobacco Use   • Smoking status: Never   • Smokeless tobacco: Never       Family History:  History reviewed. No pertinent family history.    Past Surgical History:  Past Surgical History:   Procedure Laterality Date   •  SECTION     • COSMETIC SURGERY     • HERNIA REPAIR         Problem List:  Patient Active Problem List   Diagnosis   • Depression       Allergy:   No Known Allergies     Current Medications:   Current Outpatient Medications   Medication Sig Dispense Refill   • Progesterone (PROMETRIUM) 100 MG capsule Take 1 capsule by mouth Daily.     • vilazodone (VIIBRYD) 40 MG tablet tablet Take 1 tablet by mouth Daily. 90 tablet 3   • Viloxazine HCl ER (Qelbree) 200 MG capsule sustained-release 24 hr Take 1 capsule by mouth Daily for 7 days, THEN 2 capsules Daily for 7 days, THEN 3 capsules Daily for 16 days. 69 capsule 0     No current facility-administered medications for this visit.        Review of Systems:    Review of Systems   Psychiatric/Behavioral: Positive for decreased concentration and stress.   All other systems reviewed and are negative.        Physical Exam:   Physical Exam  Constitutional:       General: She is awake.      Appearance: Normal appearance. She is well-developed.   Neurological:      Mental Status: She is alert and oriented to person, place, and time.   Psychiatric:         Attention and Perception: Attention and perception normal.         Mood and Affect: Mood and affect normal.         Speech: Speech normal.         Behavior: Behavior normal. Behavior is cooperative.         Thought Content: Thought content normal.         Cognition and Memory: Cognition and memory normal.         Judgment: Judgment normal.         Vitals:  There were no vitals taken for this visit. There is no height or weight on file to calculate BMI.  Due to extenuating circumstances and possible current health risks associated with the patient being present in a clinical setting (with current health restrictions in place in regards to possible COVID 19 transmission/exposure), the patient was seen remotely today via a MyChart Video Visit through [employer].  Unable to obtain vital signs due to nature of remote visit.  Height stated at 64 inches.  Weight stated at 166 pounds.    Last 3 Blood Pressure Readings:  BP Readings from Last 3 Encounters:   05/04/23 122/90       PHQ-9 Score:   PHQ-9 Total Score:      GARLAND-7 Score:   Feeling nervous, anxious or on edge: (P) Not at all  Not being able to stop or control worrying: (P) Several days  Worrying too much about different things: (P) Several days  Trouble Relaxing: (P) Several days  Being so restless that it is hard to sit still: (P) Not at all  Feeling afraid as if something awful might happen: (P) Not at all  Becoming easily annoyed or irritable: (P) Not at all  GARLAND 7 Total Score: (P) 3  If you checked any problems, how difficult have these problems made  it for you to do your work, take care of things at home, or get along with other people: (P) Not difficult at all     Mental Status Exam:   Hygiene:   good  Cooperation:  Cooperative  Eye Contact:  Good  Psychomotor Behavior:  Appropriate  Affect:  Appropriate  Mood: euthymic  Hopelessness: Denies  Speech:  Normal  Thought Process:  Goal directed and Linear  Thought Content:  Mood congruent  Suicidal:  None  Homicidal:  None  Hallucinations:  None  Delusion:  None  Memory:  Intact  Orientation:  Person, Place, Time and Situation  Reliability:  good  Insight:  Good  Judgement:  Good  Impulse Control:  Good  Physical/Medical Issues:  No        Lab Results:   No visits with results within 6 Month(s) from this visit.   Latest known visit with results is:   No results found for any previous visit.         Assessment & Plan   Assessment     ICD-10-CM ICD-9-CM   1. Attention deficit disorder (ADD) without hyperactivity  F98.8 314.00         Qelbree 200 mg daily for 7 days, then increase to 400 mg daily for 7 days, then increase to 600 mg daily for the next 2 weeks    Follow-up in 4 weeks or sooner if needed    SAFETY PLAN  Patient agrees to call 911/go to the nearest emergency department if he/she develops new or worsening SI, or develops intent or plan to act on these thoughts. Patient is agreeable to all elements of safety plan and verbalizes understanding.    GOALS:  Short Term Goals: Patient will be compliant with medication, and patient will have no significant medication related side effects.  Patient will be engaged in psychotherapy as indicated.  Patient will report subjective improvement of symptoms.  Long term goals: To stabilize mood and treat/improve subjective symptoms, the patient will stay out of the hospital, the patient will be at an optimal level of functioning, and the patient will take all medications as prescribed.  The patient/guardian verbalized understanding and agreement with goals that were  mutually set.      TREATMENT PLAN: Continue supportive psychotherapy efforts and medications as indicated.  Pharmacological and Non-Pharmacological treatment options discussed during today's visit. Patient/Guardian acknowledged and verbally consented with current treatment plan and was educated on the importance of compliance with treatment and follow-up appointments.      MEDICATION ISSUES:  Discussed medication options and treatment plan of prescribed medication as well as the risks, benefits, any black box warnings, and side effects including potential falls, possible impaired driving, and metabolic adversities among others. Patient is agreeable to call the office with any worsening of symptoms or onset of side effects, or if any concerns or questions arise.  The contact information for the office is made available to the patient. Patient is agreeable to call 911 or go to the nearest ER should they begin having any SI/HI, or if any urgent concerns arise. No medication side effects or related complaints today.     Medication Changes During Visit:   There are no discontinued medications.   New Medications Ordered This Visit   Medications   • Viloxazine HCl ER (Qelbree) 200 MG capsule sustained-release 24 hr     Sig: Take 1 capsule by mouth Daily for 7 days, THEN 2 capsules Daily for 7 days, THEN 3 capsules Daily for 16 days.     Dispense:  69 capsule     Refill:  0       Follow Up Appointment:   Return in about 1 month (around 6/4/2023) for Recheck.           This document has been electronically signed by PAULA Cochran  May 4, 2023 11:40 EDT    Dictated Utilizing Dragon Dictation: Part of this note may be an electronic transcription/translation of spoken language to printed text using the Dragon Dictation System.

## 2023-05-05 LAB
25(OH)D3+25(OH)D2 SERPL-MCNC: 25.5 NG/ML (ref 30–100)
ALBUMIN SERPL-MCNC: 4.3 G/DL (ref 3.8–4.9)
ALBUMIN/GLOB SERPL: 1.3 {RATIO} (ref 1.2–2.2)
ALP SERPL-CCNC: 90 IU/L (ref 44–121)
ALT SERPL-CCNC: 14 IU/L (ref 0–32)
AST SERPL-CCNC: 22 IU/L (ref 0–40)
BASOPHILS # BLD AUTO: 0.1 X10E3/UL (ref 0–0.2)
BASOPHILS NFR BLD AUTO: 1 %
BILIRUB SERPL-MCNC: 0.2 MG/DL (ref 0–1.2)
BUN SERPL-MCNC: 16 MG/DL (ref 6–24)
BUN/CREAT SERPL: 19 (ref 9–23)
CALCIUM SERPL-MCNC: 9.4 MG/DL (ref 8.7–10.2)
CHLORIDE SERPL-SCNC: 102 MMOL/L (ref 96–106)
CHOLEST SERPL-MCNC: 199 MG/DL (ref 100–199)
CO2 SERPL-SCNC: 24 MMOL/L (ref 20–29)
CREAT SERPL-MCNC: 0.84 MG/DL (ref 0.57–1)
EGFRCR SERPLBLD CKD-EPI 2021: 83 ML/MIN/1.73
EOSINOPHIL # BLD AUTO: 0.3 X10E3/UL (ref 0–0.4)
EOSINOPHIL NFR BLD AUTO: 5 %
ERYTHROCYTE [DISTWIDTH] IN BLOOD BY AUTOMATED COUNT: 11.8 % (ref 11.7–15.4)
FOLATE SERPL-MCNC: 2.6 NG/ML
GLOBULIN SER CALC-MCNC: 3.2 G/DL (ref 1.5–4.5)
GLUCOSE SERPL-MCNC: 100 MG/DL (ref 70–99)
HBA1C MFR BLD: 5.5 % (ref 4.8–5.6)
HCT VFR BLD AUTO: 43.9 % (ref 34–46.6)
HDLC SERPL-MCNC: 84 MG/DL
HGB BLD-MCNC: 15.2 G/DL (ref 11.1–15.9)
IMM GRANULOCYTES # BLD AUTO: 0 X10E3/UL (ref 0–0.1)
IMM GRANULOCYTES NFR BLD AUTO: 0 %
LDLC SERPL CALC-MCNC: 99 MG/DL (ref 0–99)
LYMPHOCYTES # BLD AUTO: 2.4 X10E3/UL (ref 0.7–3.1)
LYMPHOCYTES NFR BLD AUTO: 34 %
MCH RBC QN AUTO: 30.4 PG (ref 26.6–33)
MCHC RBC AUTO-ENTMCNC: 34.6 G/DL (ref 31.5–35.7)
MCV RBC AUTO: 88 FL (ref 79–97)
MONOCYTES # BLD AUTO: 0.7 X10E3/UL (ref 0.1–0.9)
MONOCYTES NFR BLD AUTO: 10 %
NEUTROPHILS # BLD AUTO: 3.5 X10E3/UL (ref 1.4–7)
NEUTROPHILS NFR BLD AUTO: 50 %
PLATELET # BLD AUTO: 212 X10E3/UL (ref 150–450)
POTASSIUM SERPL-SCNC: 5 MMOL/L (ref 3.5–5.2)
PROT SERPL-MCNC: 7.5 G/DL (ref 6–8.5)
RBC # BLD AUTO: 5 X10E6/UL (ref 3.77–5.28)
SODIUM SERPL-SCNC: 140 MMOL/L (ref 134–144)
TRIGL SERPL-MCNC: 90 MG/DL (ref 0–149)
TSH SERPL DL<=0.005 MIU/L-ACNC: 2.35 UIU/ML (ref 0.45–4.5)
VIT B12 SERPL-MCNC: 558 PG/ML (ref 232–1245)
VLDLC SERPL CALC-MCNC: 16 MG/DL (ref 5–40)
WBC # BLD AUTO: 6.9 X10E3/UL (ref 3.4–10.8)

## 2023-05-30 ENCOUNTER — TELEMEDICINE (OUTPATIENT)
Dept: PSYCHIATRY | Facility: CLINIC | Age: 53
End: 2023-05-30

## 2023-05-30 DIAGNOSIS — F98.8 ATTENTION DEFICIT DISORDER (ADD) WITHOUT HYPERACTIVITY: Primary | ICD-10-CM

## 2023-05-30 RX ORDER — VILOXAZINE HYDROCHLORIDE 200 MG/1
600 CAPSULE, EXTENDED RELEASE ORAL DAILY
Qty: 90 CAPSULE | Refills: 0 | Status: SHIPPED | OUTPATIENT
Start: 2023-05-30 | End: 2023-06-29

## 2023-05-30 NOTE — PROGRESS NOTES
This provider is located at the Behavioral [facility] (through [facility]), [address], [place], [address] using a secure Cardiac [employer] Video Visit through [employer]. Patient is being seen remotely via telehealth at their home address [address], and stated they are in a secure environment for this session. The patient's condition being diagnosed/treated is appropriate for telemedicine. The provider identified herself as well as her credentials.   The patient, and/or patients guardian, consent to be seen remotely, and when consent is given they understand that the consent allows for patient identifiable information to be sent to a third party as needed.   They may refuse to be seen remotely at any time. The electronic data is encrypted and password protected, and the patient and/or guardian has been advised of the potential risks to privacy not withstanding such measures.    You have chosen to receive care through a telehealth visit.  Do you consent to use a video/audio connection for your medical care today? Yes    Patient identifiers utilized: Name and date of birth.    Patient verbally confirmed consent for today's encounter:  May 30, 2023    Subjective   Eryn Dao is a 53 y.o. female who presents today for follow up    Chief Complaint:    Chief Complaint   Patient presents with   • Med Management     Adult ADD        History of Present Illness:  ADD  History of Present Illness  Eryn is a 53-year-old  female seen for follow up outpatient behavioral health services.  She is being treated by this provider for adult ADD, and is prescribed Qelbree, currently has only been on 600 mg dose x 1 week as we have been tapering up per guideline recommendations.    States she has seen a big difference since she has been on the 600 mg, states she is able to focus and concentrate more. She has seen overall improvement in wakefulness. Medication has not affected  her sleep or her appetite.            The following portions of the patient's history were reviewed and updated as appropriate: allergies, current medications, past family history, past medical history, past social history, past surgical history and problem list.    Past Psychiatric History:  Began Treatment:never treated for ADD, but had relief of symptoms with Phentermine for weight loss  Diagnoses:PPD  Psychiatrist:Damien  Therapist:Denies  Admission History:Denies  Medication Trials:paxil, viibryd, qelbree  Self Harm: Denies  Suicide Attempts:Denies   Psychosis, Anxiety, Depression: PPD    Past Medical History:  Past Medical History:   Diagnosis Date   • Neurotic depression        Substance Abuse History:   Types:Denies all, including illicit  Withdrawal Symptoms:Denies  Longest Period Sober:Not Applicable   AA: Not applicable     Social History:  Social History     Socioeconomic History   • Marital status:    Tobacco Use   • Smoking status: Never   • Smokeless tobacco: Never       Family History:  History reviewed. No pertinent family history.    Past Surgical History:  Past Surgical History:   Procedure Laterality Date   •  SECTION     • COSMETIC SURGERY     • HERNIA REPAIR         Problem List:  Patient Active Problem List   Diagnosis   • Depression       Allergy:   No Known Allergies     Current Medications:   Current Outpatient Medications   Medication Sig Dispense Refill   • Progesterone (PROMETRIUM) 100 MG capsule Take 1 capsule by mouth Daily.     • vilazodone (VIIBRYD) 40 MG tablet tablet Take 1 tablet by mouth Daily. 90 tablet 3   • Viloxazine HCl ER (Qelbree) 200 MG capsule sustained-release 24 hr Take 1 capsule by mouth Daily for 7 days, THEN 2 capsules Daily for 7 days, THEN 3 capsules Daily for 16 days. 69 capsule 0     No current facility-administered medications for this visit.       Review of Systems:    Review of Systems   All other systems reviewed and are  negative.        Physical Exam:   Physical Exam  Constitutional:       General: She is awake.      Appearance: Normal appearance. She is well-developed.   Neurological:      Mental Status: She is alert and oriented to person, place, and time.   Psychiatric:         Attention and Perception: Attention and perception normal.         Mood and Affect: Mood and affect normal.         Speech: Speech normal.         Behavior: Behavior normal. Behavior is cooperative.         Thought Content: Thought content normal.         Cognition and Memory: Cognition and memory normal.         Judgment: Judgment normal.         Vitals:  There were no vitals taken for this visit. There is no height or weight on file to calculate BMI.  Due to extenuating circumstances and possible current health risks associated with the patient being present in a clinical setting (with current health restrictions in place in regards to possible COVID 19 transmission/exposure), the patient was seen remotely today via a MyChart Video Visit through [employer].  Unable to obtain vital signs due to nature of remote visit.  Height stated at 64 inches.  Weight stated at 166 pounds.    Last 3 Blood Pressure Readings:  BP Readings from Last 3 Encounters:   05/04/23 122/90       PHQ-9 Score:   PHQ-9 Total Score:      GARLAND-7 Score:   Feeling nervous, anxious or on edge: (P) Not at all  Not being able to stop or control worrying: (P) Several days  Worrying too much about different things: (P) Several days  Trouble Relaxing: (P) Several days  Being so restless that it is hard to sit still: (P) Several days  Feeling afraid as if something awful might happen: (P) Not at all  Becoming easily annoyed or irritable: (P) Not at all  GARLAND 7 Total Score: (P) 4  If you checked any problems, how difficult have these problems made it for you to do your work, take care of things at home, or get along with other people: (P) Not difficult at all     Mental Status Exam:   Hygiene:    good  Cooperation:  Cooperative  Eye Contact:  Good  Psychomotor Behavior:  Appropriate  Affect:  Appropriate  Mood: euthymic  Hopelessness: Denies  Speech:  Normal  Thought Process:  Goal directed and Linear  Thought Content:  Mood congruent  Suicidal:  None  Homicidal:  None  Hallucinations:  None  Delusion:  None  Memory:  Intact  Orientation:  Person, Place, Time and Situation  Reliability:  good  Insight:  Good  Judgement:  Good  Impulse Control:  Good  Physical/Medical Issues:  No        Lab Results:   Office Visit on 05/04/2023   Component Date Value Ref Range Status   • Glucose 05/04/2023 100 (H)  70 - 99 mg/dL Final   • BUN 05/04/2023 16  6 - 24 mg/dL Final   • Creatinine 05/04/2023 0.84  0.57 - 1.00 mg/dL Final   • EGFR Result 05/04/2023 83  >59 mL/min/1.73 Final   • BUN/Creatinine Ratio 05/04/2023 19  9 - 23 Final   • Sodium 05/04/2023 140  134 - 144 mmol/L Final   • Potassium 05/04/2023 5.0  3.5 - 5.2 mmol/L Final   • Chloride 05/04/2023 102  96 - 106 mmol/L Final   • Total CO2 05/04/2023 24  20 - 29 mmol/L Final   • Calcium 05/04/2023 9.4  8.7 - 10.2 mg/dL Final   • Total Protein 05/04/2023 7.5  6.0 - 8.5 g/dL Final   • Albumin 05/04/2023 4.3  3.8 - 4.9 g/dL Final   • Globulin 05/04/2023 3.2  1.5 - 4.5 g/dL Final   • A/G Ratio 05/04/2023 1.3  1.2 - 2.2 Final   • Total Bilirubin 05/04/2023 0.2  0.0 - 1.2 mg/dL Final   • Alkaline Phosphatase 05/04/2023 90  44 - 121 IU/L Final   • AST (SGOT) 05/04/2023 22  0 - 40 IU/L Final   • ALT (SGPT) 05/04/2023 14  0 - 32 IU/L Final   • Hemoglobin A1C 05/04/2023 5.5  4.8 - 5.6 % Final             Prediabetes: 5.7 - 6.4           Diabetes: >6.4           Glycemic control for adults with diabetes: <7.0   • Total Cholesterol 05/04/2023 199  100 - 199 mg/dL Final   • Triglycerides 05/04/2023 90  0 - 149 mg/dL Final   • HDL Cholesterol 05/04/2023 84  >39 mg/dL Final   • VLDL Cholesterol Ralph 05/04/2023 16  5 - 40 mg/dL Final   • LDL Chol Calc ([facility]) 05/04/2023 99  0 - 99  mg/dL Final   • TSH 05/04/2023 2.350  0.450 - 4.500 uIU/mL Final   • Vitamin B-12 05/04/2023 558  232 - 1245 pg/mL Final   • 25 Hydroxy, Vitamin D 05/04/2023 25.5 (L)  30.0 - 100.0 ng/mL Final    Vitamin D deficiency has been defined by the [place] of  Medicine and an Endocrine Society practice guideline as a  level of serum 25-OH vitamin D less than 20 ng/mL (1,2).  The Endocrine Society went on to further define vitamin D  insufficiency as a level between 21 and 29 ng/mL (2).  1. IOM ([place] of Medicine). 2010. Dietary reference     intakes for calcium and D. [place]: The     National Academies Press.  2. Regis MF, Patrick HUSTON, Christal GREEN, et al.     Evaluation, treatment, and prevention of vitamin D     deficiency: an Endocrine Society clinical practice     guideline. JCEM. 2011 Jul; 96(7):1911-30.   • Folate 05/04/2023 2.6 (L)  >3.0 ng/mL Final    A serum folate concentration of less than 3.1 ng/mL is  considered to represent clinical deficiency.   • WBC 05/04/2023 6.9  3.4 - 10.8 x10E3/uL Final   • RBC 05/04/2023 5.00  3.77 - 5.28 x10E6/uL Final   • Hemoglobin 05/04/2023 15.2  11.1 - 15.9 g/dL Final   • Hematocrit 05/04/2023 43.9  34.0 - 46.6 % Final   • MCV 05/04/2023 88  79 - 97 fL Final   • MCH 05/04/2023 30.4  26.6 - 33.0 pg Final   • MCHC 05/04/2023 34.6  31.5 - 35.7 g/dL Final   • RDW 05/04/2023 11.8  11.7 - 15.4 % Final   • Platelets 05/04/2023 212  150 - 450 x10E3/uL Final   • Neutrophil Rel % 05/04/2023 50  Not Estab. % Final   • Lymphocyte Rel % 05/04/2023 34  Not Estab. % Final   • Monocyte Rel % 05/04/2023 10  Not Estab. % Final   • Eosinophil Rel % 05/04/2023 5  Not Estab. % Final   • Basophil Rel % 05/04/2023 1  Not Estab. % Final   • Neutrophils Absolute 05/04/2023 3.5  1.4 - 7.0 x10E3/uL Final   • Lymphocytes Absolute 05/04/2023 2.4  0.7 - 3.1 x10E3/uL Final   • Monocytes Absolute 05/04/2023 0.7  0.1 - 0.9 x10E3/uL Final   • Eosinophils Absolute 05/04/2023 0.3  0.0 - 0.4  x10E3/uL Final   • Basophils Absolute 05/04/2023 0.1  0.0 - 0.2 x10E3/uL Final   • Immature Granulocyte Rel % 05/04/2023 0  Not Estab. % Final   • Immature Grans Absolute 05/04/2023 0.0  0.0 - 0.1 x10E3/uL Final         Assessment & Plan   Assessment     ICD-10-CM ICD-9-CM   1. Attention deficit disorder (ADD) without hyperactivity  F98.8 314.00         Qelbree 600 mg daily     SAFETY PLAN  Patient agrees to call 911/go to the nearest emergency department if he/she develops new or worsening SI, or develops intent or plan to act on these thoughts. Patient is agreeable to all elements of safety plan and verbalizes understanding.    GOALS:  Short Term Goals: Patient will be compliant with medication, and patient will have no significant medication related side effects.  Patient will be engaged in psychotherapy as indicated.  Patient will report subjective improvement of symptoms.  Long term goals: To stabilize mood and treat/improve subjective symptoms, the patient will stay out of the hospital, the patient will be at an optimal level of functioning, and the patient will take all medications as prescribed.  The patient/guardian verbalized understanding and agreement with goals that were mutually set.      TREATMENT PLAN: Continue supportive psychotherapy efforts and medications as indicated.  Pharmacological and Non-Pharmacological treatment options discussed during today's visit. Patient/Guardian acknowledged and verbally consented with current treatment plan and was educated on the importance of compliance with treatment and follow-up appointments.      MEDICATION ISSUES:  Discussed medication options and treatment plan of prescribed medication as well as the risks, benefits, any black box warnings, and side effects including potential falls, possible impaired driving, and metabolic adversities among others. Patient is agreeable to call the office with any worsening of symptoms or onset of side effects, or if any  concerns or questions arise.  The contact information for the office is made available to the patient. Patient is agreeable to call 911 or go to the nearest ER should they begin having any SI/HI, or if any urgent concerns arise. No medication side effects or related complaints today.     Medication Changes During Visit:   There are no discontinued medications.   No orders of the defined types were placed in this encounter.      Follow Up Appointment:   Return in about 4 weeks (around 6/27/2023) for Recheck.           This document has been electronically signed by PAULA Cochran  May 30, 2023 10:15 EDT    Dictated Utilizing Dragon Dictation: Part of this note may be an electronic transcription/translation of spoken language to printed text using the Dragon Dictation System.

## 2024-01-11 ENCOUNTER — OFFICE VISIT (OUTPATIENT)
Dept: FAMILY MEDICINE CLINIC | Facility: CLINIC | Age: 54
End: 2024-01-11
Payer: COMMERCIAL

## 2024-01-11 VITALS
BODY MASS INDEX: 30.12 KG/M2 | DIASTOLIC BLOOD PRESSURE: 70 MMHG | SYSTOLIC BLOOD PRESSURE: 102 MMHG | HEIGHT: 63 IN | HEART RATE: 67 BPM | OXYGEN SATURATION: 97 % | WEIGHT: 170 LBS

## 2024-01-11 DIAGNOSIS — F33.9 DEPRESSION, RECURRENT: ICD-10-CM

## 2024-01-11 DIAGNOSIS — M54.2 NECK PAIN: Primary | ICD-10-CM

## 2024-01-11 DIAGNOSIS — F90.9 ATTENTION DEFICIT HYPERACTIVITY DISORDER (ADHD), UNSPECIFIED ADHD TYPE: ICD-10-CM

## 2024-01-11 PROCEDURE — 99214 OFFICE O/P EST MOD 30 MIN: CPT | Performed by: NURSE PRACTITIONER

## 2024-01-11 RX ORDER — METHOCARBAMOL 750 MG/1
1500 TABLET, FILM COATED ORAL 3 TIMES DAILY
Qty: 90 TABLET | Refills: 2 | Status: SHIPPED | OUTPATIENT
Start: 2024-01-11

## 2024-01-11 RX ORDER — ATOMOXETINE 80 MG/1
80 CAPSULE ORAL DAILY
Qty: 90 CAPSULE | Refills: 3 | Status: SHIPPED | OUTPATIENT
Start: 2024-01-11

## 2024-01-11 RX ORDER — ATOMOXETINE 80 MG/1
CAPSULE ORAL
COMMUNITY
Start: 2023-12-20 | End: 2024-01-11 | Stop reason: SDUPTHER

## 2024-01-11 RX ORDER — VILAZODONE HYDROCHLORIDE 40 MG/1
40 TABLET ORAL DAILY
Qty: 90 TABLET | Refills: 3 | Status: SHIPPED | OUTPATIENT
Start: 2024-01-11

## 2024-01-11 NOTE — PROGRESS NOTES
"Chief Complaint  Fatigue and Neck Pain    Subjective          Eryn Dao presents to [facility]  History of Present Illness  Pt has had neck pain that causes headaches. She has been taking Strattera but does not feel that this is controlling her symptoms. Her Viibryd is working well. She has had chronic fatigue.   Fatigue  Associated symptoms include fatigue and neck pain. Pertinent negatives include no chest pain or fever.   Neck Pain   Pertinent negatives include no chest pain or fever.       Objective   Vital Signs:   /70   Pulse 67   Ht 160 cm (63\")   Wt 77.1 kg (170 lb)   SpO2 97%   BMI 30.11 kg/m²     Body mass index is 30.11 kg/m².    Review of Systems   Constitutional:  Positive for fatigue. Negative for fever.   Respiratory:  Negative for shortness of breath.    Cardiovascular:  Negative for chest pain, palpitations and leg swelling.   Musculoskeletal:  Positive for neck pain.   Neurological:  Negative for syncope.   Psychiatric/Behavioral:  The patient is not nervous/anxious.           Current Outpatient Medications:     atomoxetine (STRATTERA) 80 MG capsule, Take 1 capsule by mouth Daily., Disp: 90 capsule, Rfl: 3    methocarbamol (ROBAXIN) 750 MG tablet, Take 2 tablets by mouth 3 (Three) Times a Day., Disp: 90 tablet, Rfl: 2    vilazodone (VIIBRYD) 40 MG tablet tablet, Take 1 tablet by mouth Daily., Disp: 90 tablet, Rfl: 3      Allergies: Contrast dye (echo or unknown ct/mr)    Physical Exam  Constitutional:       Appearance: Normal appearance.   HENT:      Head: Normocephalic.   Eyes:      Conjunctiva/sclera: Conjunctivae normal.      Pupils: Pupils are equal, round, and reactive to light.   Cardiovascular:      Rate and Rhythm: Normal rate and regular rhythm.      Heart sounds: Normal heart sounds.   Pulmonary:      Effort: Pulmonary effort is normal.      Breath sounds: Normal breath sounds.   Abdominal:      Tenderness: There is no abdominal tenderness. "   Musculoskeletal:         General: Normal range of motion.   Skin:     General: Skin is warm and dry.      Capillary Refill: Capillary refill takes less than 2 seconds.   Neurological:      General: No focal deficit present.      Mental Status: She is alert and oriented to person, place, and time.   Psychiatric:         Mood and Affect: Mood normal.         Behavior: Behavior normal.         Thought Content: Thought content normal.         Judgment: Judgment normal.          Result Review :                   Assessment and Plan    Diagnoses and all orders for this visit:    1. Neck pain (Primary)  Comments:  XRs done and MRI ordered. We may refer to neurosurgery.  Orders:  -     methocarbamol (ROBAXIN) 750 MG tablet; Take 2 tablets by mouth 3 (Three) Times a Day.  Dispense: 90 tablet; Refill: 2  -     XR Spine Cervical 2 or 3 View; Future  -     MRI Cervical Spine Without Contrast; Future    2. Depression, recurrent  Comments:  Continue Viibryd.  Orders:  -     vilazodone (VIIBRYD) 40 MG tablet tablet; Take 1 tablet by mouth Daily.  Dispense: 90 tablet; Refill: 3    3. Attention deficit hyperactivity disorder (ADHD), unspecified ADHD type  Comments:  Continue Strattera. F/U with mental health to discuss alternatives.  Orders:  -     atomoxetine (STRATTERA) 80 MG capsule; Take 1 capsule by mouth Daily.  Dispense: 90 capsule; Refill: 3  -     Ambulatory Referral to Behavioral Health                Follow Up   Return in about 6 months (around 7/11/2024) for Recheck.  Patient was given instructions and counseling regarding her condition or for health maintenance advice. Please see specific information pulled into the AVS if appropriate.     PAULA Luis

## 2024-01-24 ENCOUNTER — TELEPHONE (OUTPATIENT)
Dept: FAMILY MEDICINE CLINIC | Facility: CLINIC | Age: 54
End: 2024-01-24

## 2024-03-12 ENCOUNTER — TELEPHONE (OUTPATIENT)
Dept: FAMILY MEDICINE CLINIC | Facility: CLINIC | Age: 54
End: 2024-03-12
Payer: COMMERCIAL

## 2024-03-12 NOTE — TELEPHONE ENCOUNTER
This is coming up in overdue result. I couldn't tell if you ever did reach patient. Please see communications in referral

## 2024-03-12 NOTE — LETTER
March 19, 2024    Eryn [address]  [facility] [address]     We are writing to you due to not being able to reach you by phone. Please give us a call and ask for referrals.    Thank you                              PAULA Luis

## 2024-05-01 RX ORDER — FEZOLINETANT 45 MG/1
45 TABLET, FILM COATED ORAL EVERY 24 HOURS
Qty: 90 TABLET | Refills: 3 | Status: SHIPPED | OUTPATIENT
Start: 2024-05-01